# Patient Record
Sex: MALE | URBAN - METROPOLITAN AREA
[De-identification: names, ages, dates, MRNs, and addresses within clinical notes are randomized per-mention and may not be internally consistent; named-entity substitution may affect disease eponyms.]

---

## 2022-06-23 ENCOUNTER — APPOINTMENT (RX ONLY)
Dept: URBAN - METROPOLITAN AREA CLINIC 174 | Facility: CLINIC | Age: 52
Setting detail: DERMATOLOGY
End: 2022-06-23

## 2022-06-23 DIAGNOSIS — L71.8 OTHER ROSACEA: ICD-10-CM

## 2022-06-23 DIAGNOSIS — L73.8 OTHER SPECIFIED FOLLICULAR DISORDERS: ICD-10-CM

## 2022-06-23 DIAGNOSIS — L82.1 OTHER SEBORRHEIC KERATOSIS: ICD-10-CM

## 2022-06-23 PROCEDURE — ? PRESCRIPTION

## 2022-06-23 PROCEDURE — 99203 OFFICE O/P NEW LOW 30 MIN: CPT

## 2022-06-23 PROCEDURE — ? OTHER

## 2022-06-23 PROCEDURE — ? COUNSELING

## 2022-06-23 RX ORDER — MINOCYCLINE HYDROCHLORIDE 50 MG/1
1 TABLET ORAL QD
Qty: 30 | Refills: 2 | Status: ERX | COMMUNITY
Start: 2022-06-23

## 2022-06-23 RX ADMIN — MINOCYCLINE HYDROCHLORIDE 1: 50 TABLET ORAL at 00:00

## 2022-06-23 ASSESSMENT — LOCATION SIMPLE DESCRIPTION DERM
LOCATION SIMPLE: LEFT CHEEK
LOCATION SIMPLE: LEFT FOREHEAD
LOCATION SIMPLE: RIGHT FOREHEAD
LOCATION SIMPLE: RIGHT CHEEK

## 2022-06-23 ASSESSMENT — LOCATION DETAILED DESCRIPTION DERM
LOCATION DETAILED: LEFT INFERIOR MEDIAL FOREHEAD
LOCATION DETAILED: RIGHT SUPERIOR FOREHEAD
LOCATION DETAILED: LEFT FOREHEAD
LOCATION DETAILED: LEFT MEDIAL MALAR CHEEK
LOCATION DETAILED: RIGHT MEDIAL FOREHEAD
LOCATION DETAILED: RIGHT MEDIAL MALAR CHEEK

## 2022-06-23 ASSESSMENT — LOCATION ZONE DERM: LOCATION ZONE: FACE

## 2022-06-23 NOTE — PROCEDURE: OTHER
Detail Level: Zone
Render Risk Assessment In Note?: no
Other (Free Text): Sunlight/heat may be a trigger.\\nPt has used metrogel without improvement.\\nWill start minocycline 50mg qd and follow up in 3 months.
Note Text (......Xxx Chief Complaint.): This diagnosis correlates with the

## 2022-09-19 ENCOUNTER — APPOINTMENT (RX ONLY)
Dept: URBAN - METROPOLITAN AREA CLINIC 174 | Facility: CLINIC | Age: 52
Setting detail: DERMATOLOGY
End: 2022-09-19

## 2022-09-19 DIAGNOSIS — L82.1 OTHER SEBORRHEIC KERATOSIS: ICD-10-CM

## 2022-09-19 DIAGNOSIS — L71.8 OTHER ROSACEA: ICD-10-CM | Status: IMPROVED

## 2022-09-19 PROCEDURE — ? COUNSELING

## 2022-09-19 PROCEDURE — 99213 OFFICE O/P EST LOW 20 MIN: CPT

## 2022-09-19 PROCEDURE — ? PRESCRIPTION

## 2022-09-19 PROCEDURE — ? OTHER

## 2022-09-19 RX ORDER — MINOCYCLINE HYDROCHLORIDE 50 MG/1
1 TABLET ORAL DAILY
Qty: 30 | Refills: 5 | Status: ERX

## 2022-09-19 ASSESSMENT — LOCATION DETAILED DESCRIPTION DERM
LOCATION DETAILED: LEFT DISTAL DORSAL FOREARM
LOCATION DETAILED: LEFT INFERIOR MEDIAL FOREHEAD
LOCATION DETAILED: RIGHT FOREHEAD
LOCATION DETAILED: LEFT PROXIMAL DORSAL FOREARM
LOCATION DETAILED: RIGHT MEDIAL MALAR CHEEK
LOCATION DETAILED: RIGHT PROXIMAL DORSAL FOREARM
LOCATION DETAILED: LEFT MEDIAL MALAR CHEEK

## 2022-09-19 ASSESSMENT — LOCATION SIMPLE DESCRIPTION DERM
LOCATION SIMPLE: LEFT FOREARM
LOCATION SIMPLE: RIGHT FOREARM
LOCATION SIMPLE: RIGHT CHEEK
LOCATION SIMPLE: LEFT CHEEK
LOCATION SIMPLE: LEFT FOREHEAD
LOCATION SIMPLE: RIGHT FOREHEAD

## 2022-09-19 ASSESSMENT — LOCATION ZONE DERM
LOCATION ZONE: FACE
LOCATION ZONE: ARM

## 2022-09-19 NOTE — PROCEDURE: OTHER
Detail Level: Zone
Render Risk Assessment In Note?: no
Other (Free Text): TODAYS VISIT- Patient has improved with minocycline 50mg daily. Will continue with once a day. Discussed the importance of sunscreen.\\n\\nLOV- Sunlight/heat may be a trigger. Pt has used metrogel without improvement. Will start minocycline 50mg qd and follow up in 3 months.
Note Text (......Xxx Chief Complaint.): This diagnosis correlates with the

## 2022-09-26 ENCOUNTER — RX ONLY (OUTPATIENT)
Age: 52
Setting detail: RX ONLY
End: 2022-09-26

## 2022-09-26 RX ORDER — MINOCYCLINE HYDROCHLORIDE 50 MG/1
1 CAPSULE ORAL QD
Qty: 30 | Refills: 5 | Status: ERX | COMMUNITY
Start: 2022-09-26

## 2023-03-20 ENCOUNTER — APPOINTMENT (RX ONLY)
Dept: URBAN - METROPOLITAN AREA CLINIC 158 | Facility: CLINIC | Age: 53
Setting detail: DERMATOLOGY
End: 2023-03-20